# Patient Record
Sex: MALE | Race: WHITE | Employment: OTHER | URBAN - NONMETROPOLITAN AREA
[De-identification: names, ages, dates, MRNs, and addresses within clinical notes are randomized per-mention and may not be internally consistent; named-entity substitution may affect disease eponyms.]

---

## 2019-03-30 ENCOUNTER — NURSE TRIAGE (OUTPATIENT)
Dept: ADMINISTRATIVE | Age: 69
End: 2019-03-30

## 2020-02-04 ENCOUNTER — HOSPITAL ENCOUNTER (EMERGENCY)
Age: 70
Discharge: HOME | End: 2020-02-05
Attending: EMERGENCY MEDICINE
Payer: OTHER GOVERNMENT

## 2020-02-04 ENCOUNTER — APPOINTMENT (OUTPATIENT)
Dept: GENERAL RADIOLOGY | Age: 70
End: 2020-02-04
Payer: OTHER GOVERNMENT

## 2020-02-04 ENCOUNTER — APPOINTMENT (OUTPATIENT)
Dept: CT IMAGING | Age: 70
End: 2020-02-04
Payer: OTHER GOVERNMENT

## 2020-02-04 LAB
ANION GAP SERPL CALCULATED.3IONS-SCNC: 17 MEQ/L (ref 8–16)
BASOPHILS # BLD: 0.7 %
BASOPHILS ABSOLUTE: 0.1 THOU/MM3 (ref 0–0.1)
BUN BLDV-MCNC: 11 MG/DL (ref 7–22)
CALCIUM SERPL-MCNC: 8.8 MG/DL (ref 8.5–10.5)
CHLORIDE BLD-SCNC: 102 MEQ/L (ref 98–111)
CO2: 20 MEQ/L (ref 23–33)
CREAT SERPL-MCNC: 0.5 MG/DL (ref 0.4–1.2)
EKG ATRIAL RATE: 79 BPM
EKG P AXIS: 64 DEGREES
EKG P-R INTERVAL: 156 MS
EKG Q-T INTERVAL: 394 MS
EKG QRS DURATION: 98 MS
EKG QTC CALCULATION (BAZETT): 451 MS
EKG R AXIS: 60 DEGREES
EKG T AXIS: 56 DEGREES
EKG VENTRICULAR RATE: 79 BPM
EOSINOPHIL # BLD: 1.6 %
EOSINOPHILS ABSOLUTE: 0.1 THOU/MM3 (ref 0–0.4)
ERYTHROCYTE [DISTWIDTH] IN BLOOD BY AUTOMATED COUNT: 13.3 % (ref 11.5–14.5)
ERYTHROCYTE [DISTWIDTH] IN BLOOD BY AUTOMATED COUNT: 44.9 FL (ref 35–45)
ETHYL ALCOHOL, SERUM: 0.21 %
GFR SERPL CREATININE-BSD FRML MDRD: > 90 ML/MIN/1.73M2
GLUCOSE BLD-MCNC: 82 MG/DL (ref 70–108)
HCT VFR BLD CALC: 48.5 % (ref 42–52)
HEMOGLOBIN: 16.1 GM/DL (ref 14–18)
IMMATURE GRANS (ABS): 0.01 THOU/MM3 (ref 0–0.07)
IMMATURE GRANULOCYTES: 0.1 %
LYMPHOCYTES # BLD: 40.4 %
LYMPHOCYTES ABSOLUTE: 3.4 THOU/MM3 (ref 1–4.8)
MCH RBC QN AUTO: 30.2 PG (ref 26–33)
MCHC RBC AUTO-ENTMCNC: 33.2 GM/DL (ref 32.2–35.5)
MCV RBC AUTO: 91 FL (ref 80–94)
MONOCYTES # BLD: 8.7 %
MONOCYTES ABSOLUTE: 0.7 THOU/MM3 (ref 0.4–1.3)
NUCLEATED RED BLOOD CELLS: 0 /100 WBC
OSMOLALITY CALCULATION: 276 MOSMOL/KG (ref 275–300)
PLATELET # BLD: 142 THOU/MM3 (ref 130–400)
PMV BLD AUTO: 10.6 FL (ref 9.4–12.4)
POTASSIUM SERPL-SCNC: 4.4 MEQ/L (ref 3.5–5.2)
RBC # BLD: 5.33 MILL/MM3 (ref 4.7–6.1)
SEG NEUTROPHILS: 48.5 %
SEGMENTED NEUTROPHILS ABSOLUTE COUNT: 4.1 THOU/MM3 (ref 1.8–7.7)
SODIUM BLD-SCNC: 139 MEQ/L (ref 135–145)
WBC # BLD: 8.5 THOU/MM3 (ref 4.8–10.8)

## 2020-02-04 PROCEDURE — 12011 RPR F/E/E/N/L/M 2.5 CM/<: CPT

## 2020-02-04 PROCEDURE — 72170 X-RAY EXAM OF PELVIS: CPT

## 2020-02-04 PROCEDURE — 84443 ASSAY THYROID STIM HORMONE: CPT

## 2020-02-04 PROCEDURE — 36415 COLL VENOUS BLD VENIPUNCTURE: CPT

## 2020-02-04 PROCEDURE — 99285 EMERGENCY DEPT VISIT HI MDM: CPT

## 2020-02-04 PROCEDURE — 82248 BILIRUBIN DIRECT: CPT

## 2020-02-04 PROCEDURE — 70486 CT MAXILLOFACIAL W/O DYE: CPT

## 2020-02-04 PROCEDURE — 85025 COMPLETE CBC W/AUTO DIFF WBC: CPT

## 2020-02-04 PROCEDURE — 83735 ASSAY OF MAGNESIUM: CPT

## 2020-02-04 PROCEDURE — 80053 COMPREHEN METABOLIC PANEL: CPT

## 2020-02-04 PROCEDURE — G0480 DRUG TEST DEF 1-7 CLASSES: HCPCS

## 2020-02-04 PROCEDURE — 72125 CT NECK SPINE W/O DYE: CPT

## 2020-02-04 PROCEDURE — 70450 CT HEAD/BRAIN W/O DYE: CPT

## 2020-02-04 PROCEDURE — 71045 X-RAY EXAM CHEST 1 VIEW: CPT

## 2020-02-04 PROCEDURE — 93005 ELECTROCARDIOGRAM TRACING: CPT | Performed by: EMERGENCY MEDICINE

## 2020-02-04 PROCEDURE — 83690 ASSAY OF LIPASE: CPT

## 2020-02-04 PROCEDURE — 84484 ASSAY OF TROPONIN QUANT: CPT

## 2020-02-04 ASSESSMENT — PAIN DESCRIPTION - LOCATION: LOCATION: HEAD

## 2020-02-04 ASSESSMENT — PAIN SCALES - GENERAL: PAINLEVEL_OUTOF10: 5

## 2020-02-04 ASSESSMENT — PAIN DESCRIPTION - FREQUENCY: FREQUENCY: CONTINUOUS

## 2020-02-04 ASSESSMENT — PAIN DESCRIPTION - PAIN TYPE: TYPE: ACUTE PAIN

## 2020-02-05 VITALS
HEIGHT: 70 IN | RESPIRATION RATE: 18 BRPM | WEIGHT: 200 LBS | BODY MASS INDEX: 28.63 KG/M2 | TEMPERATURE: 98 F | HEART RATE: 82 BPM | SYSTOLIC BLOOD PRESSURE: 149 MMHG | OXYGEN SATURATION: 98 % | DIASTOLIC BLOOD PRESSURE: 96 MMHG

## 2020-02-05 LAB
ALBUMIN SERPL-MCNC: 3.9 G/DL (ref 3.5–5.1)
ALP BLD-CCNC: 51 U/L (ref 38–126)
ALT SERPL-CCNC: 26 U/L (ref 11–66)
AST SERPL-CCNC: 29 U/L (ref 5–40)
BILIRUB SERPL-MCNC: 0.4 MG/DL (ref 0.3–1.2)
BILIRUBIN DIRECT: < 0.2 MG/DL (ref 0–0.3)
LIPASE: 30.5 U/L (ref 5.6–51.3)
MAGNESIUM: 2.2 MG/DL (ref 1.6–2.4)
TOTAL PROTEIN: 7.1 G/DL (ref 6.1–8)
TROPONIN T: < 0.01 NG/ML
TSH SERPL DL<=0.05 MIU/L-ACNC: 3.22 UIU/ML (ref 0.4–4.2)

## 2020-02-05 PROCEDURE — 6370000000 HC RX 637 (ALT 250 FOR IP): Performed by: EMERGENCY MEDICINE

## 2020-02-05 RX ORDER — ONDANSETRON 4 MG/1
4 TABLET, ORALLY DISINTEGRATING ORAL 3 TIMES DAILY PRN
Qty: 21 TABLET | Refills: 0 | Status: SHIPPED | OUTPATIENT
Start: 2020-02-05 | End: 2020-09-17

## 2020-02-05 RX ORDER — ACETAMINOPHEN 325 MG/1
TABLET ORAL
Status: DISCONTINUED
Start: 2020-02-05 | End: 2020-02-05 | Stop reason: HOSPADM

## 2020-02-05 RX ORDER — ACETAMINOPHEN 325 MG/1
650 TABLET ORAL ONCE
Status: COMPLETED | OUTPATIENT
Start: 2020-02-05 | End: 2020-02-05

## 2020-02-05 RX ORDER — HYDROCODONE BITARTRATE AND ACETAMINOPHEN 5; 325 MG/1; MG/1
1 TABLET ORAL EVERY 6 HOURS PRN
Qty: 12 TABLET | Refills: 0 | Status: SHIPPED | OUTPATIENT
Start: 2020-02-05 | End: 2020-02-08

## 2020-02-05 RX ORDER — CEPHALEXIN 500 MG/1
500 CAPSULE ORAL 4 TIMES DAILY
Qty: 40 CAPSULE | Refills: 0 | Status: SHIPPED | OUTPATIENT
Start: 2020-02-05 | End: 2020-02-15

## 2020-02-05 RX ADMIN — ACETAMINOPHEN 650 MG: 325 TABLET ORAL at 00:14

## 2020-02-05 ASSESSMENT — ENCOUNTER SYMPTOMS
SORE THROAT: 0
ABDOMINAL PAIN: 0
BLOOD IN STOOL: 0
COUGH: 0
VOMITING: 0
EYE DISCHARGE: 0
RHINORRHEA: 0
EYE REDNESS: 0
EYE ITCHING: 0
SHORTNESS OF BREATH: 0
WHEEZING: 0
EYE PAIN: 0
BACK PAIN: 0
SINUS PRESSURE: 0
VOICE CHANGE: 0
PHOTOPHOBIA: 0
CHOKING: 0
ABDOMINAL DISTENTION: 0
CONSTIPATION: 0
NAUSEA: 0
CHEST TIGHTNESS: 0
DIARRHEA: 0
TROUBLE SWALLOWING: 0

## 2020-02-05 ASSESSMENT — PAIN DESCRIPTION - PAIN TYPE: TYPE: ACUTE PAIN

## 2020-02-05 ASSESSMENT — PAIN SCALES - GENERAL
PAINLEVEL_OUTOF10: 8
PAINLEVEL_OUTOF10: 5

## 2020-02-05 NOTE — ED PROVIDER NOTES
Head: Normocephalic and atraumatic. Right Ear: External ear normal.      Left Ear: External ear normal.      Nose: Nose normal.   Eyes:      General: Lids are normal. No scleral icterus. Right eye: No discharge. Left eye: No discharge. Conjunctiva/sclera: Conjunctivae normal.      Right eye: No exudate. Left eye: No exudate. Pupils: Pupils are equal, round, and reactive to light. Neck:      Musculoskeletal: Normal range of motion and neck supple. Normal range of motion. Thyroid: No thyromegaly. Vascular: No JVD. Trachea: No tracheal deviation. Cardiovascular:      Rate and Rhythm: Normal rate and regular rhythm. Pulses: Normal pulses. Heart sounds: Normal heart sounds, S1 normal and S2 normal. No murmur. No friction rub. No gallop. Pulmonary:      Effort: Pulmonary effort is normal. No respiratory distress. Breath sounds: Normal breath sounds. No stridor. No wheezing or rales. Chest:      Chest wall: No tenderness. Abdominal:      General: Bowel sounds are normal. There is no distension. Palpations: Abdomen is soft. There is no mass. Tenderness: There is no abdominal tenderness. There is no guarding or rebound. Musculoskeletal: Normal range of motion. General: No tenderness. Right shoulder: He exhibits no tenderness, no bony tenderness, no crepitus and normal strength. Lymphadenopathy:      Cervical: No cervical adenopathy. Skin:     General: Skin is warm and dry. Findings: No bruising, ecchymosis, lesion or rash. Neurological:      Mental Status: He is alert and oriented to person, place, and time. Cranial Nerves: No cranial nerve deficit. Sensory: No sensory deficit. Coordination: Coordination normal.      Deep Tendon Reflexes: Reflexes are normal and symmetric. Psychiatric:         Speech: Speech normal.         Behavior: Behavior normal.         Thought Content:  Thought content normal.         Judgment: Judgment normal.         DIFFERENTIAL DIAGNOSIS:   Frontal diagnosis discussed extensively bedside with the patient including but not limited to closed head injury, laceration abrasion. DIAGNOSTIC RESULTS     EKG: All EKG's are interpreted by the Emergency Department Physician who either signs or Co-signs this chart in the absence of a cardiologist.  None    RADIOLOGY: non-plain film images(s) such as CT, Ultrasound and MRI are read by the radiologist.  [address]   Final Result      No acute ischemic infarct, hemorrhage, or mass effect. Mild left frontal scalp soft tissue swelling. **This report has been created using voice recognition software. It may contain minor errors which are inherent in voice recognition technology. **      Final report electronically signed by Dr. Nikolai Coats on 2/5/2020 1:33 AM      CT CERVICAL SPINE WO CONTRAST   Final Result    No acute fracture or subluxation. Multilevel degenerative disc disease, DJD, and neural foraminal stenoses. Annular bulges and disc osteophyte complexes from C2-3 through C7-T1 with varying severity spinal stenosis and multilevel cord impingement/compression. **This report has been created using voice recognition software. It may contain minor errors which are inherent in voice recognition technology. **      Final report electronically signed by Dr. Nikolai Coats on 2/5/2020 1:41 AM      CT FACIAL BONES WO CONTRAST   Final Result   No acute fracture. Mild left frontal scalp soft tissue swelling. Dental disease. **This report has been created using voice recognition software. It may contain minor errors which are inherent in voice recognition technology. **      Final report electronically signed by Dr. Nikolai Coats on 2/5/2020 1:44 AM      XR PELVIS (1-2 VIEWS)   Final Result    No acute bone or joint abnormality. **This report has been created using voice recognition software.  It may contain minor errors which are inherent in voice recognition technology. **      Final report electronically signed by Dr. Yael Ricketts on 2/5/2020 12:13 AM      XR CHEST PORTABLE   Final Result      No acute cardiopulmonary disease. **This report has been created using voice recognition software. It may contain minor errors which are inherent in voice recognition technology. **      Final report electronically signed by Dr. Yael Ricketts on 2/5/2020 12:18 AM            LABS:   Labs Reviewed   BASIC METABOLIC PANEL - Abnormal; Notable for the following components:       Result Value    CO2 20 (*)     All other components within normal limits   ANION GAP - Abnormal; Notable for the following components:    Anion Gap 17.0 (*)     All other components within normal limits   CBC WITH AUTO DIFFERENTIAL   ETHANOL   HEPATIC FUNCTION PANEL   LIPASE   TROPONIN   MAGNESIUM   TSH WITHOUT REFLEX   GLOMERULAR FILTRATION RATE, ESTIMATED   OSMOLALITY       EMERGENCY DEPARTMENT COURSE:   Vitals:    Vitals:    02/04/20 2307 02/05/20 0007 02/05/20 0111   BP: (!) 158/109 131/86 (!) 149/96   Pulse: 72 64 82   Resp: 18 18 18   Temp: 98 °F (36.7 °C)     TempSrc: Oral     SpO2: 96% 98% 98%   Weight: 200 lb (90.7 kg)     Height: 5' 10\" (1.778 m)       Patient was assessed at bedside appropriate labs and imaging were ordered. He was neurologically intact. He was laughing at bedside. He had a small laceration above his left eyebrow. Multiple abrasions on his face. I reviewed all labs and imaging went back to reassess the patient he is doing quite well. No acute intracranial processes. Laceration was repaired by Padilla Gauthier please see note by her. Patient remained stable throughout course. Patient is subsequently discharged home in stable condition. He is instructed to follow-up with his primary care physician in 7 to 10 days to get the stitches removed.   Patient and son at bedside understood and agreed with this plan.  Patient is subsequently discharged home in good condition. Patient had a fall with some minor lacerations and contusions. Patient has been given pain medication and antibiotics and antinausea medication he is instructed to take it as prescribed. He is instructed to watch for any worsening of the symptoms such as increased nausea vomiting dizziness headaches fevers chills sweats discharge from the wound as this may indicate a worsening infection he should be coming back to the emergency room immediately. Patient is instructed to follow-up with his primary care office or with this department in 7 to 10 days for suture removal.  Patient is instructed to take all current medications as prescribed. Patient is instructed to return to the nearest emergency room immediately for any new or worsening complaints. CRITICAL CARE:   None    CONSULTS:  None    PROCEDURES:  None    FINAL IMPRESSION      1. Fall, initial encounter    2. Contusion of face, initial encounter    3. Facial laceration, initial encounter    4. Closed head injury, initial encounter          DISPOSITION/PLAN   Discharge    PATIENT REFERRED TO:  Sunitha Stanton, [address] [place] [id number]  [phone]    In 1 week  For suture removal    [facility] EMERGENCY DEPT  [place] [id number]  [phone]  Go in 1 week  For suture removal      DISCHARGE MEDICATIONS:  Discharge Medication List as of 2/5/2020  1:50 AM      START taking these medications    Details   cephALEXin (KEFLEX) 500 MG capsule Take 1 capsule by mouth 4 times daily for 10 days, Disp-40 capsule, R-0Print      HYDROcodone-acetaminophen (NORCO) 5-325 MG per tablet Take 1 tablet by mouth every 6 hours as needed for Pain for up to 3 days. , Disp-12 tablet, R-0Print      ondansetron (ZOFRAN-ODT) 4 MG disintegrating tablet Take 1 tablet by mouth 3 times daily as needed for Nausea or Vomiting, Disp-21 tablet, R-0Print             (Please note that portions of this note were completed with a voice recognition program.  Efforts were made to edit the dictations but occasionally words are mis-transcribed.)    DO Ramu Tellez DO  02/05/20 1359

## 2020-08-25 ENCOUNTER — HOSPITAL ENCOUNTER (OUTPATIENT)
Age: 70
Discharge: HOME | End: 2020-08-25
Payer: OTHER GOVERNMENT

## 2020-08-25 ENCOUNTER — HOSPITAL ENCOUNTER (OUTPATIENT)
Dept: MRI IMAGING | Age: 70
Discharge: HOME | End: 2020-08-25
Payer: OTHER GOVERNMENT

## 2020-08-25 ENCOUNTER — HOSPITAL ENCOUNTER (OUTPATIENT)
Dept: GENERAL RADIOLOGY | Age: 70
Discharge: HOME | End: 2020-08-25
Payer: OTHER GOVERNMENT

## 2020-08-25 PROCEDURE — 72100 X-RAY EXAM L-S SPINE 2/3 VWS: CPT

## 2020-08-25 PROCEDURE — 73521 X-RAY EXAM HIPS BI 2 VIEWS: CPT

## 2020-08-25 PROCEDURE — 72148 MRI LUMBAR SPINE W/O DYE: CPT

## 2020-09-17 ENCOUNTER — HOSPITAL ENCOUNTER (EMERGENCY)
Age: 70
Discharge: HOME | End: 2020-09-17
Attending: EMERGENCY MEDICINE
Payer: OTHER GOVERNMENT

## 2020-09-17 ENCOUNTER — APPOINTMENT (OUTPATIENT)
Dept: CT IMAGING | Age: 70
End: 2020-09-17
Payer: OTHER GOVERNMENT

## 2020-09-17 ENCOUNTER — APPOINTMENT (OUTPATIENT)
Dept: GENERAL RADIOLOGY | Age: 70
End: 2020-09-17
Payer: OTHER GOVERNMENT

## 2020-09-17 VITALS
WEIGHT: 180 LBS | HEART RATE: 74 BPM | DIASTOLIC BLOOD PRESSURE: 68 MMHG | RESPIRATION RATE: 15 BRPM | SYSTOLIC BLOOD PRESSURE: 112 MMHG | TEMPERATURE: 98.1 F | OXYGEN SATURATION: 98 % | BODY MASS INDEX: 25.83 KG/M2

## 2020-09-17 LAB
ANION GAP SERPL CALCULATED.3IONS-SCNC: 10 MEQ/L (ref 8–16)
BASOPHILS # BLD: 0.3 %
BASOPHILS ABSOLUTE: 0 THOU/MM3 (ref 0–0.1)
BUN BLDV-MCNC: 14 MG/DL (ref 7–22)
CALCIUM SERPL-MCNC: 8.7 MG/DL (ref 8.5–10.5)
CHLORIDE BLD-SCNC: 100 MEQ/L (ref 98–111)
CO2: 24 MEQ/L (ref 23–33)
CREAT SERPL-MCNC: 0.5 MG/DL (ref 0.4–1.2)
EKG ATRIAL RATE: 75 BPM
EKG P AXIS: 57 DEGREES
EKG P-R INTERVAL: 146 MS
EKG Q-T INTERVAL: 372 MS
EKG QRS DURATION: 92 MS
EKG QTC CALCULATION (BAZETT): 415 MS
EKG R AXIS: 61 DEGREES
EKG T AXIS: 58 DEGREES
EKG VENTRICULAR RATE: 75 BPM
EOSINOPHIL # BLD: 0.3 %
EOSINOPHILS ABSOLUTE: 0 THOU/MM3 (ref 0–0.4)
ERYTHROCYTE [DISTWIDTH] IN BLOOD BY AUTOMATED COUNT: 12.7 % (ref 11.5–14.5)
ERYTHROCYTE [DISTWIDTH] IN BLOOD BY AUTOMATED COUNT: 41.9 FL (ref 35–45)
GFR SERPL CREATININE-BSD FRML MDRD: > 90 ML/MIN/1.73M2
GLUCOSE BLD-MCNC: 94 MG/DL (ref 70–108)
HCT VFR BLD CALC: 47.7 % (ref 42–52)
HEMOGLOBIN: 15.7 GM/DL (ref 14–18)
IMMATURE GRANS (ABS): 0.02 THOU/MM3 (ref 0–0.07)
IMMATURE GRANULOCYTES: 0.6 %
LYMPHOCYTES # BLD: 24.9 %
LYMPHOCYTES ABSOLUTE: 0.9 THOU/MM3 (ref 1–4.8)
MCH RBC QN AUTO: 29.4 PG (ref 26–33)
MCHC RBC AUTO-ENTMCNC: 32.9 GM/DL (ref 32.2–35.5)
MCV RBC AUTO: 89.3 FL (ref 80–94)
MONOCYTES # BLD: 13.8 %
MONOCYTES ABSOLUTE: 0.5 THOU/MM3 (ref 0.4–1.3)
NUCLEATED RED BLOOD CELLS: 0 /100 WBC
OSMOLALITY CALCULATION: 268.5 MOSMOL/KG (ref 275–300)
PLATELET # BLD: 120 THOU/MM3 (ref 130–400)
PMV BLD AUTO: 10.4 FL (ref 9.4–12.4)
POTASSIUM REFLEX MAGNESIUM: 4.2 MEQ/L (ref 3.5–5.2)
PRO-BNP: 13 PG/ML (ref 0–900)
RBC # BLD: 5.34 MILL/MM3 (ref 4.7–6.1)
SEG NEUTROPHILS: 60.1 %
SEGMENTED NEUTROPHILS ABSOLUTE COUNT: 2.2 THOU/MM3 (ref 1.8–7.7)
SODIUM BLD-SCNC: 134 MEQ/L (ref 135–145)
TROPONIN T: < 0.01 NG/ML
WBC # BLD: 3.6 THOU/MM3 (ref 4.8–10.8)

## 2020-09-17 PROCEDURE — 96374 THER/PROPH/DIAG INJ IV PUSH: CPT

## 2020-09-17 PROCEDURE — 83880 ASSAY OF NATRIURETIC PEPTIDE: CPT

## 2020-09-17 PROCEDURE — U0003 INFECTIOUS AGENT DETECTION BY NUCLEIC ACID (DNA OR RNA); SEVERE ACUTE RESPIRATORY SYNDROME CORONAVIRUS 2 (SARS-COV-2) (CORONAVIRUS DISEASE [COVID-19]), AMPLIFIED PROBE TECHNIQUE, MAKING USE OF HIGH THROUGHPUT TECHNOLOGIES AS DESCRIBED BY CMS-2020-01-R: HCPCS

## 2020-09-17 PROCEDURE — 85025 COMPLETE CBC W/AUTO DIFF WBC: CPT

## 2020-09-17 PROCEDURE — 2580000003 HC RX 258: Performed by: EMERGENCY MEDICINE

## 2020-09-17 PROCEDURE — U0002 COVID-19 LAB TEST NON-CDC: HCPCS

## 2020-09-17 PROCEDURE — 93005 ELECTROCARDIOGRAM TRACING: CPT | Performed by: EMERGENCY MEDICINE

## 2020-09-17 PROCEDURE — 71045 X-RAY EXAM CHEST 1 VIEW: CPT

## 2020-09-17 PROCEDURE — 36415 COLL VENOUS BLD VENIPUNCTURE: CPT

## 2020-09-17 PROCEDURE — 74176 CT ABD & PELVIS W/O CONTRAST: CPT

## 2020-09-17 PROCEDURE — 93010 ELECTROCARDIOGRAM REPORT: CPT | Performed by: NUCLEAR MEDICINE

## 2020-09-17 PROCEDURE — 99284 EMERGENCY DEPT VISIT MOD MDM: CPT

## 2020-09-17 PROCEDURE — 6360000002 HC RX W HCPCS: Performed by: EMERGENCY MEDICINE

## 2020-09-17 PROCEDURE — 84484 ASSAY OF TROPONIN QUANT: CPT

## 2020-09-17 PROCEDURE — 80048 BASIC METABOLIC PNL TOTAL CA: CPT

## 2020-09-17 RX ORDER — ONDANSETRON 4 MG/1
4 TABLET, ORALLY DISINTEGRATING ORAL EVERY 8 HOURS PRN
Qty: 20 TABLET | Refills: 0 | Status: SHIPPED | OUTPATIENT
Start: 2020-09-17 | End: 2022-05-19

## 2020-09-17 RX ORDER — 0.9 % SODIUM CHLORIDE 0.9 %
1000 INTRAVENOUS SOLUTION INTRAVENOUS ONCE
Status: COMPLETED | OUTPATIENT
Start: 2020-09-17 | End: 2020-09-17

## 2020-09-17 RX ORDER — ONDANSETRON 2 MG/ML
4 INJECTION INTRAMUSCULAR; INTRAVENOUS ONCE
Status: COMPLETED | OUTPATIENT
Start: 2020-09-17 | End: 2020-09-17

## 2020-09-17 RX ORDER — LISINOPRIL 10 MG/1
10 TABLET ORAL DAILY
COMMUNITY

## 2020-09-17 RX ADMIN — SODIUM CHLORIDE 1000 ML: 9 INJECTION, SOLUTION INTRAVENOUS at 18:49

## 2020-09-17 RX ADMIN — ONDANSETRON 4 MG: 2 INJECTION INTRAMUSCULAR; INTRAVENOUS at 18:49

## 2020-09-17 ASSESSMENT — PAIN DESCRIPTION - ORIENTATION: ORIENTATION: MID

## 2020-09-17 ASSESSMENT — PAIN DESCRIPTION - ONSET: ONSET: ON-GOING

## 2020-09-17 ASSESSMENT — PAIN DESCRIPTION - PROGRESSION: CLINICAL_PROGRESSION: GRADUALLY IMPROVING

## 2020-09-17 ASSESSMENT — PAIN DESCRIPTION - LOCATION: LOCATION: ABDOMEN

## 2020-09-17 ASSESSMENT — PAIN DESCRIPTION - PAIN TYPE: TYPE: ACUTE PAIN

## 2020-09-17 ASSESSMENT — PAIN DESCRIPTION - FREQUENCY: FREQUENCY: CONTINUOUS

## 2020-09-17 ASSESSMENT — PAIN DESCRIPTION - DESCRIPTORS: DESCRIPTORS: ACHING

## 2020-09-17 ASSESSMENT — PAIN SCALES - GENERAL: PAINLEVEL_OUTOF10: 2

## 2020-09-17 NOTE — ED NOTES
Patient is resting in bed with easy and unlabored respirations. Call light in reach. Side rails up x2. Patient denies further complaints or concerns. Will monitor.         Kat Harris RN  09/17/20 4132

## 2020-09-17 NOTE — ED PROVIDER NOTES
[facility] EMERGENCY DEPT      CHIEF COMPLAINT       Chief Complaint   Patient presents with    Fatigue    Nausea       Nurses Notes reviewed and I agree except as noted in the HPI. HISTORY OF PRESENT ILLNESS    Jenny Martinez is a 72 y.o. male who presents with complaint of fatigue, nausea, poor appetite, fever that has since resolved. No chest pain. He has mild abdominal pain, mostly left abdomen, denies diarrhea. Onset: Acute  Duration: About 5 days  Timing: Constant  Location of Pain: Left abdomen  Intesity/severity: Mild  Modifying Factors: Not apparent  Relieved by;  Previous Episodes; Tx Before arrival: None  REVIEW OF SYSTEMS      Review of Systems   Constitutional: Negative for fever, chills, diaphoresis; positive for fatigue, poor appetite. HENT: Negative for congestion, drooling, facial swelling and sore throat. Eyes: Negative for photophobia, pain and discharge. Respiratory: Negative for cough, shortness of breath, wheezing and stridor. Cardiovascular: Negative for chest pain, palpitations and leg swelling. Gastrointestinal: Positive for abdominal pain; negative for blood in stool and abdominal distention. Genitourinary: Negative for dysuria, urgency, hematuria and difficulty urinating. Musculoskeletal: Negative for gait problem, neck pain and neck stiffness. Skin; No rash, No itching  Neurological: Negative for seizures, weakness and numbness. Psychiatric/Behavioral: Negative for hallucinations, confusion and agitation. PAST MEDICAL HISTORY    has a past medical history of Gresham's esophagus, Hyperlipidemia, and Hypertension. SURGICAL HISTORY      has a past surgical history that includes Knee Arthroplasty (Bilateral); Neck surgery; Cholecystectomy (2005); hernia repair (2005); Colonoscopy (2015); and EGD (2017).     CURRENT MEDICATIONS       Discharge Medication List as of 9/17/2020  7:58 PM      CONTINUE these medications which have NOT CHANGED    Details lisinopril (PRINIVIL;ZESTRIL) 10 MG tablet Take 10 mg by mouth dailyHistorical Med      polyethylene glycol (GLYCOLAX) powder Colonoscopy Prep Dispense 238 Gram Bottle. Use as Directed, Disp-238 g, R-0Print      oxyCODONE-acetaminophen (PERCOCET)  MG per tablet Take 1 tablet by mouth every 8 hours as needed for Pain. Historical Med             ALLERGIES     is allergic to dilaudid [hydromorphone] and tramadol. FAMILY HISTORY     He indicated that the status of his neg hx is unknown.   family history is not on file. SOCIAL HISTORY      reports that he quit smoking about 10 years ago. His smokeless tobacco use includes chew. He reports current alcohol use. He reports that he does not use drugs. PHYSICAL EXAM     INITIAL VITALS:  weight is 180 lb (81.6 kg). His oral temperature is 98.1 °F (36.7 °C). His blood pressure is 112/68 and his pulse is 74. His respiration is 15 and oxygen saturation is 98%. Physical Exam   Constitutional:  well-developed and well-nourished. HENT: Head: Normocephalic, atraumatic, Bilateral external ears normal, Oropharynx mosit, No oral exudates, Nose normal.   Eyes: PERRL, EOMI, Conjunctiva normal, No discharge. No scleral icterus  Neck: Normal range of motion, No tenderness, Supple  Cardiovascular: Normal rate, regular rhythm, S1 normal and S2 normal.  Exam reveals no gallop. Pulmonary/Chest: Effort normal and breath sounds normal. No accessory muscle usage or stridor. No respiratory distress. no wheezes. has no rales. exhibits no tenderness. Abdominal: Soft. Bowel sounds are normal.  exhibits no distension. There is no tenderness. There is no rebound and no guarding. Extremities: No edema, no tenderness, no cyanosis, no clubbing. Musculoskeletal: Good range of motion in major joints is observed. No major deformities noted. Neurological: Alert and oriented ×3, normal motor function, normal sensory function, no focal deficits.   GCS 15  Skin: Skin is warm, dry and intact. No rash noted. No erythema. Psychiatric: Affect normal, judgment normal, mood normal.  DIFFERENTIAL DIAGNOSIS:   COVID, pneumonia, diverticulitis    DIAGNOSTIC RESULTS     EKG: All EKG's are interpreted by the Emergency Department Physician who either signs or Co-signs this chart in the absence of a cardiologist.      RADIOLOGY: non-plain film images(s) such as CT, Ultrasound and MRI are read by the radiologist.  Plain radiographic images are visualized and preliminarily interpreted by the emergency physician unless otherwise stated below. LABS:   Labs Reviewed   CBC WITH AUTO DIFFERENTIAL - Abnormal; Notable for the following components:       Result Value    WBC 3.6 (*)     Platelets 172 (*)     Lymphocytes Absolute 0.9 (*)     All other components within normal limits   BASIC METABOLIC PANEL W/ REFLEX TO MG FOR LOW K - Abnormal; Notable for the following components:    Sodium 134 (*)     All other components within normal limits   OSMOLALITY - Abnormal; Notable for the following components:    Osmolality Calc 268.5 (*)     All other components within normal limits   TROPONIN   BRAIN NATRIURETIC PEPTIDE   ANION GAP   GLOMERULAR FILTRATION RATE, ESTIMATED   URINE RT REFLEX TO CULTURE   COVID-19   COVID-19       EMERGENCY DEPARTMENT COURSE:   Vitals:    Vitals:    09/17/20 1740 09/17/20 1850 09/17/20 2003   BP: 112/83 114/76 112/68   Pulse: 80 85 74   Resp: 13 16 15   Temp: 98.1 °F (36.7 °C)     TempSrc: Oral     SpO2: 97% 97% 98%   Weight: 180 lb (81.6 kg)       Patient presenting with complaint of poor appetite, abdominal pain, nausea. Abdomen is nonacute, mild tenderness left abdomen. CRITICAL CARE:       CONSULTS:  None    PROCEDURES:  None    FINAL IMPRESSION      1. Fatigue, unspecified type    2. Poor appetite          DISPOSITION/PLAN   Decision To Discharge    PATIENT REFERRED TO:  No follow-up provider specified.     DISCHARGE MEDICATIONS:  Discharge Medication List as of 9/17/2020

## 2020-09-17 NOTE — ED NOTES
Patient to ED with complaints of fatigue x4 days. Patient states that he had a fever 3 days ago and states that this has resolved itself. He states that his appetite is still decreased however and has intermittent nausea. Patient is resting in bed with easy and unlabored respirations. Call light in reach. Side rails up x2. Patient denies further complaints or concerns. Will monitor.         Endy Rachel RN  09/17/20 2954

## 2020-09-19 LAB — SARS-COV-2: NOT DETECTED

## 2021-04-16 ENCOUNTER — HOSPITAL ENCOUNTER (OUTPATIENT)
Age: 71
Discharge: HOME | End: 2021-04-16
Payer: OTHER GOVERNMENT

## 2021-04-16 ENCOUNTER — HOSPITAL ENCOUNTER (OUTPATIENT)
Dept: GENERAL RADIOLOGY | Age: 71
Discharge: HOME | End: 2021-04-16
Payer: OTHER GOVERNMENT

## 2021-04-16 DIAGNOSIS — M25.512 LEFT SHOULDER PAIN, UNSPECIFIED CHRONICITY: ICD-10-CM

## 2021-04-16 DIAGNOSIS — M54.2 NECK PAIN: ICD-10-CM

## 2021-04-16 PROCEDURE — 73030 X-RAY EXAM OF SHOULDER: CPT

## 2021-04-16 PROCEDURE — 72040 X-RAY EXAM NECK SPINE 2-3 VW: CPT

## 2022-05-19 ENCOUNTER — OFFICE VISIT (OUTPATIENT)
Dept: PHYSICAL MEDICINE AND REHAB | Age: 72
End: 2022-05-19
Payer: OTHER GOVERNMENT

## 2022-05-19 VITALS
BODY MASS INDEX: 27.11 KG/M2 | HEIGHT: 70 IN | DIASTOLIC BLOOD PRESSURE: 86 MMHG | SYSTOLIC BLOOD PRESSURE: 120 MMHG | WEIGHT: 189.4 LBS

## 2022-05-19 DIAGNOSIS — G89.29 OTHER CHRONIC PAIN: ICD-10-CM

## 2022-05-19 DIAGNOSIS — M79.18 MYOFASCIAL PAIN: ICD-10-CM

## 2022-05-19 DIAGNOSIS — M47.812 CERVICAL SPONDYLOSIS: Primary | ICD-10-CM

## 2022-05-19 DIAGNOSIS — M50.90 CERVICAL DISC DISEASE: ICD-10-CM

## 2022-05-19 PROCEDURE — 99204 OFFICE O/P NEW MOD 45 MIN: CPT | Performed by: ANESTHESIOLOGY

## 2022-05-19 NOTE — PROGRESS NOTES
Chronic Pain/PM&R Clinic Note     Encounter Date: 5/19/22    Subjective:   Chief Complaint:   Chief Complaint   Patient presents with    New Patient     Lumbar, neck and bilateral shoulder pain       History of Present Illness:   Jaleesa Rosas is a 80 y.o. male seen in the clinic initially on 05/19/22 upon request from Peter Lyn DO (PCP) for his history of chronic neck pain. He has a medical history of previous cervical discectomy in 1990. He states he has longstanding history of neck pain since his neck surgery 1990. He describes the majority of his pain to be on the left side of his neck but also has some right-sided neck pain. He states that this pain starts at the nape of his neck and can radiate down into his shoulder blade region and trapezius. He states that the pain is worse when he is extending his neck. His pain is improved with rest and medications. He is being medically managed through the [place] [facility] and is currently taking Percocet 3 times daily. He denies any pain rating further down his arm, he denies any associated numbness/tingling, weakness, worsening balance/gait disturbances. He also denies any associated headaches with this. Of note, he is a  of the [employer] He is an avid golfer as well and would like to try to improve his cough again if he gets improvement in his neck range of motion. He is also seen pain management in the past with Dr. La Magallanes. He states he has had multiple different neck injections which were effective for him but he never pursued the cervical radiofrequency ablation.       History of Interventions:   Surgery: No previous lumbar/cervical surgeries  Injections: None    Imaging/Studies:  XR C-spine (4/16/21)     PROCEDURE: XR CERVICAL SPINE (2-3 VIEWS)       CLINICAL INFORMATION: Neck pain, Left shoulder pain, unspecified chronicity       COMPARISON: No prior study.       TECHNIQUE: Standard AP, lateral, and odontoid views of the cervical spine were obtained.               Impression   1. Moderate multilevel degenerative facet arthropathy bilaterally, slightly worse on the left. Moderate uncinate spurring C4-5 and C5-6 bilaterally and questionably at C6-7 right side. 2. Mild reversal normal cervical lordosis. Cannot exclude muscle spasm. 3. Moderate disc space narrowing C4-5, C5-6, C6-7. Moderate vertebral body spondylosis lower half of cervical spine. Slight retrolisthesis C4 upon C5, 2 mm. Prevertebral soft tissues unremarkable. Note that C7 is partially obscured by the shoulders on    the lateral view         Past Medical History:   Diagnosis Date    Gresham's esophagus     Hyperlipidemia     Hypertension        Past Surgical History:   Procedure Laterality Date    CHOLECYSTECTOMY  2005    COLONOSCOPY  2015    EGD  2017    HERNIA REPAIR  2005    KNEE ARTHROPLASTY Bilateral     NECK SURGERY         Family History   Problem Relation Age of Onset    Colon Cancer Neg Hx     Esophageal Cancer Neg Hx     Rectal Cancer Neg Hx     Stomach Cancer Neg Hx          Medications & Allergies:   Current Outpatient Medications   Medication Instructions    lisinopril (PRINIVIL;ZESTRIL) 10 mg, Oral, Patient states he only takes it a couple times a week.     oxyCODONE-acetaminophen (PERCOCET)  MG per tablet 1 tablet, Oral, EVERY 8 HOURS PRN       Allergies   Allergen Reactions    Dilaudid [Hydromorphone] Shortness Of Breath    Tramadol Shortness Of Breath       Review of Systems:   Constitutional: negative for weight changes or fevers  Genitourinary: negative for bowel/bladder incontinence   Musculoskeletal: positive for neck pain  Neurological: negative for any arm weakness or numbness/tingling  Behavioral/Psych: negative for anxiety/depression   All other systems reviewed and are negative    Objective:     Vitals:    05/19/22 0854   BP: 120/86     Constitutional: Pleasant, no acute distress   Head: Normocephalic, atraumatic Eyes: Conjunctivae normal   Neck: Supple, symmetrical   Respiration: Non-labored breathing   Cardiovascular: Limbs warm and well perfused   Musculoskeletal: Reduced cervical ROM in extension and left lateral rotation. Negative Spurling maneuver bilaterally. Increased pain with facet loading. Tenderness to palpation in cervical paraspinals and left trapezius. Neuro: Alert, oriented. CN II-XII appear grossly intact. Motor strength 5/5 SAb, EF, EE, WE, Beatrice. LT sensation intact in upper limbs. Biceps/triceps reflexes 2+ and symmetrical. Negative Guillory bilaterally. Skin: no skin rashes or lesions noted   Psychological: Cooperative, no exaggerated pain behaviors       Assessment:    Diagnosis Orders   1. Cervical spondylosis  CHG FLUOR NEEDLE/CATH SPINE/PARASPINAL DX/THER ADDON    CO INJ DX/THER AGNT PARAVERT FACET JOINT, CERV/THORAC, 1ST LEVEL    CO INJ DX/THER AGNT PARAVERT FACET JOINT, CERV/THORAC, 2ND LEVEL    CO INJ DX/THER AGNT PARAVERT FACET JOINT, CERV/THORAC, ADD LEVEL   2. Myofascial pain     3. Cervical disc disease     4. Other chronic pain           Dax Mendoza is a 80 y.o.male presenting to the pain clinic for evaluation of chronic neck pain. His clinical history and physical examination are consistent with cervical facet mediated pain secondary to cervical degenerative disc disease and cervical spondylosis. I set the patient up for diagnostic cervical medial branch blocks targeting the bilateral facet joints at C3/C4, C4/C5, C5/C6. Plan: The following treatment recommendations and plan were discussed in detail with Dax Mendoza. Imaging/Studies/Labs:   I have reviewed patients imaging of cervical neck XR and results were discussed with patient today. Interventions: In presence of cervical neck and with physical exam consistent for facetal pain, the option of medial branch blocks at bilateral C3/C4, C4/C5, and C5/C6 was chosen.  The risks and benefits were discussed in detail with the patient. Patient wants to proceed with the injection. Anticoagulation/NPO Recommendations:   Patient will need to hold NSAIDS x 2 days prior to the procedure. Patient will need to be NPO x 8 hours prior to the procedure. Plan to start an IV prior to the procedure. Multidisciplinary Pain Management:   In the presence of complex, chronic, and multi-factorial pain, the importance of a multidisciplinary approach to pain management in the patients management regimen was emphasized and discussed in great detail.    PHYSICAL THERAPY: Patient given instructions on neck range of motion exercises he can perform at home    Referrals:  None    Prescriptions Written This Visit:   None    Follow-up: For cervical MBBs      Salazar Lopez, DO  Interventional Pain Management/PM&R   [place]

## 2022-06-15 ENCOUNTER — PREP FOR PROCEDURE (OUTPATIENT)
Dept: PHYSICAL MEDICINE AND REHAB | Age: 72
End: 2022-06-15

## 2022-06-21 ENCOUNTER — TELEPHONE (OUTPATIENT)
Dept: PHYSICAL MEDICINE AND REHAB | Age: 72
End: 2022-06-21

## 2022-06-21 NOTE — TELEPHONE ENCOUNTER
Notified by the Frank Menezes that pt. Was ill and did not come to procedure. Pt. Contacted. Procedure and follow up rescheduled. Another copy of his pre-procedure instructions mailed. Verbalized understanding.

## 2022-06-29 ENCOUNTER — PREP FOR PROCEDURE (OUTPATIENT)
Dept: PHYSICAL MEDICINE AND REHAB | Age: 72
End: 2022-06-29

## 2022-07-06 ENCOUNTER — TELEPHONE (OUTPATIENT)
Dept: PHYSICAL MEDICINE AND REHAB | Age: 72
End: 2022-07-06

## 2022-07-06 NOTE — TELEPHONE ENCOUNTER
Notified by the Frank Menezes that pt. Did not come to procedure today. They did contact him and he was still in bed. Pt. Called. LVM to call the office. Procedure and follow up cancelled.

## 2023-11-15 ENCOUNTER — HOSPITAL ENCOUNTER (EMERGENCY)
Age: 73
Discharge: HOME | End: 2023-11-15
Attending: EMERGENCY MEDICINE
Payer: OTHER GOVERNMENT

## 2023-11-15 ENCOUNTER — APPOINTMENT (OUTPATIENT)
Dept: CT IMAGING | Age: 73
End: 2023-11-15
Payer: OTHER GOVERNMENT

## 2023-11-15 ENCOUNTER — APPOINTMENT (OUTPATIENT)
Dept: GENERAL RADIOLOGY | Age: 73
End: 2023-11-15
Payer: OTHER GOVERNMENT

## 2023-11-15 VITALS
HEART RATE: 82 BPM | TEMPERATURE: 98.2 F | BODY MASS INDEX: 25.77 KG/M2 | DIASTOLIC BLOOD PRESSURE: 94 MMHG | RESPIRATION RATE: 16 BRPM | SYSTOLIC BLOOD PRESSURE: 140 MMHG | OXYGEN SATURATION: 99 % | WEIGHT: 180 LBS | HEIGHT: 70 IN

## 2023-11-15 DIAGNOSIS — I10 ESSENTIAL HYPERTENSION: Primary | ICD-10-CM

## 2023-11-15 LAB
ANION GAP SERPL CALC-SCNC: 12 MEQ/L (ref 8–16)
BASOPHILS ABSOLUTE: 0 THOU/MM3 (ref 0–0.1)
BASOPHILS NFR BLD AUTO: 0.4 %
BUN SERPL-MCNC: 13 MG/DL (ref 7–22)
CALCIUM SERPL-MCNC: 9 MG/DL (ref 8.5–10.5)
CHLORIDE SERPL-SCNC: 105 MEQ/L (ref 98–111)
CO2 SERPL-SCNC: 22 MEQ/L (ref 23–33)
CREAT SERPL-MCNC: 0.6 MG/DL (ref 0.4–1.2)
DEPRECATED RDW RBC AUTO: 46.6 FL (ref 35–45)
EOSINOPHIL NFR BLD AUTO: 1.3 %
EOSINOPHILS ABSOLUTE: 0.1 THOU/MM3 (ref 0–0.4)
ERYTHROCYTE [DISTWIDTH] IN BLOOD BY AUTOMATED COUNT: 13.7 % (ref 11.5–14.5)
GFR SERPL CREATININE-BSD FRML MDRD: > 60 ML/MIN/1.73M2
GLUCOSE SERPL-MCNC: 84 MG/DL (ref 70–108)
HCT VFR BLD AUTO: 51 % (ref 42–52)
HGB BLD-MCNC: 16.9 GM/DL (ref 14–18)
IMM GRANULOCYTES # BLD AUTO: 0.01 THOU/MM3 (ref 0–0.07)
IMM GRANULOCYTES NFR BLD AUTO: 0.1 %
LYMPHOCYTES ABSOLUTE: 2 THOU/MM3 (ref 1–4.8)
LYMPHOCYTES NFR BLD AUTO: 28.4 %
MAGNESIUM SERPL-MCNC: 2.1 MG/DL (ref 1.6–2.4)
MCH RBC QN AUTO: 30.6 PG (ref 26–33)
MCHC RBC AUTO-ENTMCNC: 33.1 GM/DL (ref 32.2–35.5)
MCV RBC AUTO: 92.2 FL (ref 80–94)
MONOCYTES ABSOLUTE: 0.5 THOU/MM3 (ref 0.4–1.3)
MONOCYTES NFR BLD AUTO: 6.6 %
NEUTROPHILS NFR BLD AUTO: 63.2 %
NRBC BLD AUTO-RTO: 0 /100 WBC
NT-PROBNP SERPL IA-MCNC: < 36 PG/ML (ref 0–124)
OSMOLALITY SERPL CALC.SUM OF ELEC: 276.8 MOSMOL/KG (ref 275–300)
PLATELET # BLD AUTO: 187 THOU/MM3 (ref 130–400)
PMV BLD AUTO: 11.2 FL (ref 9.4–12.4)
POTASSIUM SERPL-SCNC: 4.3 MEQ/L (ref 3.5–5.2)
RBC # BLD AUTO: 5.53 MILL/MM3 (ref 4.7–6.1)
SEGMENTED NEUTROPHILS ABSOLUTE COUNT: 4.4 THOU/MM3 (ref 1.8–7.7)
SODIUM SERPL-SCNC: 139 MEQ/L (ref 135–145)
TROPONIN, HIGH SENSITIVITY: 13 NG/L (ref 0–12)
TROPONIN, HIGH SENSITIVITY: 14 NG/L (ref 0–12)
WBC # BLD AUTO: 6.9 THOU/MM3 (ref 4.8–10.8)

## 2023-11-15 PROCEDURE — 80048 BASIC METABOLIC PNL TOTAL CA: CPT

## 2023-11-15 PROCEDURE — 85025 COMPLETE CBC W/AUTO DIFF WBC: CPT

## 2023-11-15 PROCEDURE — 71045 X-RAY EXAM CHEST 1 VIEW: CPT

## 2023-11-15 PROCEDURE — 36415 COLL VENOUS BLD VENIPUNCTURE: CPT

## 2023-11-15 PROCEDURE — 84484 ASSAY OF TROPONIN QUANT: CPT

## 2023-11-15 PROCEDURE — 99285 EMERGENCY DEPT VISIT HI MDM: CPT

## 2023-11-15 PROCEDURE — 83735 ASSAY OF MAGNESIUM: CPT

## 2023-11-15 PROCEDURE — 83880 ASSAY OF NATRIURETIC PEPTIDE: CPT

## 2023-11-15 PROCEDURE — 96374 THER/PROPH/DIAG INJ IV PUSH: CPT

## 2023-11-15 PROCEDURE — 6360000002 HC RX W HCPCS

## 2023-11-15 PROCEDURE — 96372 THER/PROPH/DIAG INJ SC/IM: CPT

## 2023-11-15 PROCEDURE — 70450 CT HEAD/BRAIN W/O DYE: CPT

## 2023-11-15 PROCEDURE — 93005 ELECTROCARDIOGRAM TRACING: CPT | Performed by: EMERGENCY MEDICINE

## 2023-11-15 RX ORDER — DIPHENHYDRAMINE HYDROCHLORIDE 50 MG/ML
25 INJECTION INTRAMUSCULAR; INTRAVENOUS EVERY 6 HOURS PRN
Status: DISCONTINUED | OUTPATIENT
Start: 2023-11-15 | End: 2023-11-15 | Stop reason: HOSPADM

## 2023-11-15 RX ORDER — LOSARTAN POTASSIUM 25 MG/1
25 TABLET ORAL DAILY
COMMUNITY

## 2023-11-15 RX ORDER — DROPERIDOL 2.5 MG/ML
1.25 INJECTION, SOLUTION INTRAMUSCULAR; INTRAVENOUS EVERY 6 HOURS PRN
Status: DISCONTINUED | OUTPATIENT
Start: 2023-11-15 | End: 2023-11-15 | Stop reason: HOSPADM

## 2023-11-15 RX ADMIN — DROPERIDOL 1.25 MG: 2.5 INJECTION, SOLUTION INTRAMUSCULAR; INTRAVENOUS at 18:22

## 2023-11-15 RX ADMIN — DIPHENHYDRAMINE HYDROCHLORIDE 25 MG: 50 INJECTION INTRAMUSCULAR; INTRAVENOUS at 18:22

## 2023-11-15 ASSESSMENT — PAIN - FUNCTIONAL ASSESSMENT
PAIN_FUNCTIONAL_ASSESSMENT: 0-10
PAIN_FUNCTIONAL_ASSESSMENT: NONE - DENIES PAIN

## 2023-11-15 ASSESSMENT — PAIN SCALES - GENERAL: PAINLEVEL_OUTOF10: 5

## 2023-11-15 ASSESSMENT — PAIN DESCRIPTION - LOCATION: LOCATION: CHEST

## 2023-11-15 NOTE — ED TRIAGE NOTES
Presents to ED from home with complains of high blood pressure that has been ongoing since [employer]. Pt report he was recently started on losartan, but feels it has not been working. Pt also reports chest pain that began one hour ago. EKG completed.

## 2023-11-15 NOTE — ED PROVIDER NOTES
[facility] DEPT  EMERGENCY DEPARTMENT ENCOUNTER          Pt Name: Ubaldo Jordan  MRN: [id number]  [address] [facility] 1950  Date of evaluation: 11/15/2023  Physician: Christy Brock MD  Supervising Attending Physician: Yaima Coleman DO       CHIEF COMPLAINT       Chief Complaint   Patient presents with    Hypertension         HISTORY OF PRESENT ILLNESS    HPI  Ubaldo Jordan is a 68 y.o. male who presents to the emergency department from home, by private vehicle for evaluation of chest tightness and high blood pressure. Patient reports that last night began to fill it tightness sensation in his chest.  Patient reports that he took his blood pressure and is above 032 systolic. Patient reports he went to bed and this morning he is continue to have the chest tightness. Patient reports that he checked his blood pressure at home was in the 086 systolic. Patient states that he called the [place] and they told him to come in the ED. Patient describes it as a tightness across the front of his chest at a 6/10. Patient also reports some shortness of breath. Patient denies any fever or chills. Patient denies any nausea or vomiting. Patient reports that on Monday he was started on losartan for high blood pressure. Patient denies any other changes in medications. Patient denies any change in bowel habits. The patient has no other acute complaints at this time.           PAST MEDICAL AND SURGICAL HISTORY     Past Medical History:   Diagnosis Date    Gresham's esophagus     Hyperlipidemia     Hypertension      Past Surgical History:   Procedure Laterality Date    CHOLECYSTECTOMY  2005    COLONOSCOPY  2015    EGD  2017    HERNIA REPAIR  2005    KNEE ARTHROPLASTY Bilateral     NECK SURGERY           MEDICATIONS     Current Facility-Administered Medications:     droperidol (INAPSINE) injection 1.25 mg, 1.25 mg, IntraMUSCular, Q6H PRN, Christy Brock MD, 1.25 mg at 11/15/23 9609

## 2023-11-15 NOTE — ED NOTES
Patient resting in bed. Respirations easy and unlabored. No distress noted. Call light within reach.      Prema Ferris, KERON  11/15/23 5418

## 2023-11-15 NOTE — ED NOTES
Patient resting in bed with eyes closed, respirations easy and unlabored. Lights dimmed and door closed for patient comfort. Call light in reach. Will continue to monitor.       Billing, Garnetta Sicard [place]) M, RN  11/15/23 [id number]

## 2023-11-19 LAB
EKG ATRIAL RATE: 89 BPM
EKG P AXIS: 65 DEGREES
EKG P-R INTERVAL: 144 MS
EKG Q-T INTERVAL: 354 MS
EKG QRS DURATION: 88 MS
EKG QTC CALCULATION (BAZETT): 430 MS
EKG R AXIS: 22 DEGREES
EKG T AXIS: 42 DEGREES
EKG VENTRICULAR RATE: 89 BPM

## 2024-02-15 ENCOUNTER — HOSPITAL ENCOUNTER (EMERGENCY)
Age: 74
Discharge: HOME | End: 2024-02-15
Payer: OTHER GOVERNMENT

## 2024-02-15 VITALS
RESPIRATION RATE: 20 BRPM | SYSTOLIC BLOOD PRESSURE: 162 MMHG | OXYGEN SATURATION: 98 % | HEART RATE: 91 BPM | TEMPERATURE: 97.5 F | BODY MASS INDEX: 26.17 KG/M2 | WEIGHT: 182.4 LBS | DIASTOLIC BLOOD PRESSURE: 99 MMHG

## 2024-02-15 DIAGNOSIS — N30.01 ACUTE CYSTITIS WITH HEMATURIA: Primary | ICD-10-CM

## 2024-02-15 LAB
BILIRUB UR STRIP.AUTO-MCNC: NEGATIVE MG/DL
CHARACTER UR: CLEAR
COLOR: YELLOW
GLUCOSE UR QL STRIP.AUTO: NEGATIVE MG/DL
KETONES UR QL STRIP.AUTO: NEGATIVE
NITRITE UR QL STRIP.AUTO: NEGATIVE
PH UR STRIP.AUTO: 7 [PH] (ref 5–9)
PROT UR STRIP.AUTO-MCNC: 30 MG/DL
RBC #/AREA URNS HPF: ABNORMAL /[HPF]
SP GR UR STRIP.AUTO: 1.02 (ref 1–1.03)
UROBILINOGEN, URINE: 0.2 EU/DL (ref 0.2–1)
WBC #/AREA URNS HPF: ABNORMAL /[HPF]

## 2024-02-15 PROCEDURE — 99213 OFFICE O/P EST LOW 20 MIN: CPT

## 2024-02-15 PROCEDURE — 87086 URINE CULTURE/COLONY COUNT: CPT

## 2024-02-15 PROCEDURE — 99203 OFFICE O/P NEW LOW 30 MIN: CPT | Performed by: NURSE PRACTITIONER

## 2024-02-15 PROCEDURE — 81003 URINALYSIS AUTO W/O SCOPE: CPT

## 2024-02-15 RX ORDER — CIPROFLOXACIN 500 MG/1
500 TABLET, FILM COATED ORAL 2 TIMES DAILY
Qty: 14 TABLET | Refills: 0 | Status: SHIPPED | OUTPATIENT
Start: 2024-02-15 | End: 2024-02-22

## 2024-02-15 ASSESSMENT — PAIN - FUNCTIONAL ASSESSMENT: PAIN_FUNCTIONAL_ASSESSMENT: NONE - DENIES PAIN

## 2024-02-15 NOTE — ED PROVIDER NOTES
[facility] URGENT CARE  Urgent Care Encounter       CHIEF COMPLAINT       Chief Complaint   Patient presents with    Dysuria       Nurses Notes reviewed and I agree except as noted in the HPI.  HISTORY OF PRESENT ILLNESS   Mikel Vela is a 73 y.o. male who presents with complaints of discomfort and urinary frequency when urinating.  This is a new problem that started through the night.  Patient reports voiding 7-8 times this morning.  This is a new problem.  He does admit to problems with constipation.  He had a small bowel movement this morning.  Takes daily Dulcolax.  No reported flank pain or fever.  [facility]    The history is provided by the patient.       REVIEW OF SYSTEMS     Review of Systems   Constitutional:  Negative for fever.   Cardiovascular:  Negative for chest pain.   Gastrointestinal:  Negative for abdominal pain, nausea and vomiting.   Genitourinary:  Positive for dysuria and frequency. Negative for flank pain and hematuria.   Musculoskeletal:  Negative for myalgias.       PAST MEDICAL HISTORY         Diagnosis Date    Gresham's esophagus     Hyperlipidemia     Hypertension        SURGICALHISTORY     Patient  has a past surgical history that includes Knee Arthroplasty (Bilateral); Neck surgery; Cholecystectomy (2005); hernia repair (2005); Colonoscopy (2015); and EGD (2017).    CURRENT MEDICATIONS       Previous Medications    BISACODYL (DULCOLAX) 5 MG EC TABLET    See Prep Instructions    LOSARTAN (COZAAR) 25 MG TABLET    Take 1 tablet by mouth daily    OXYCODONE-ACETAMINOPHEN (PERCOCET)  MG PER TABLET    Take 1 tablet by mouth every 4 hours as needed for Pain.    POLYETHYLENE GLYCOL (GLYCOLAX) 17 GM/SCOOP POWDER    Dispense 238 Gram Bottle.  Use as Directed    TROLAMINE SALICYLATE (ASPERCREME) 10 % CREAM    Apply topically as needed for Pain Apply topically as needed.       ALLERGIES     Patient is is allergic to dilaudid  [hydromorphone] and tramadol.    Patients   There is no immunization history on file for this patient.    FAMILY HISTORY     Patient's family history is not on file.    SOCIAL HISTORY     Patient  reports that he has quit smoking. He has quit using smokeless tobacco.  His smokeless tobacco use included chew. He reports current alcohol use. He reports that he does not use drugs.    PHYSICAL EXAM     ED TRIAGE VITALS  BP: (!) 162/99, Temp: 97.5 °F (36.4 °C), Pulse: 91, Respirations: 20, SpO2: 98 %,Estimated body mass index is 26.17 kg/m² as calculated from the following:    Height as of 11/15/23: 1.778 m (5' 10\").    Weight as of this encounter: 82.7 kg (182 lb 6.4 oz).,No LMP for male patient.    Physical Exam  Vitals and nursing note reviewed.   Constitutional:       General: He is not in acute distress.  HENT:      Mouth/Throat:      Mouth: Mucous membranes are moist.   Cardiovascular:      Rate and Rhythm: Normal rate and regular rhythm.      Heart sounds: Normal heart sounds.   Pulmonary:      Effort: Pulmonary effort is normal.      Breath sounds: Normal breath sounds.   Abdominal:      General: Abdomen is flat. Bowel sounds are decreased. There is no distension.      Palpations: Abdomen is soft.      Tenderness: There is no abdominal tenderness. There is no right CVA tenderness, left CVA tenderness or guarding.   Skin:     General: Skin is warm and dry.   Neurological:      Mental Status: He is alert and oriented to person, place, and time.       DIAGNOSTIC RESULTS     Labs:  Results for orders placed or performed during the hospital encounter of 02/15/24   Urinalysis   Result Value Ref Range    Glucose, Ur Negative NEGATIVE mg/dl    Bilirubin Urine Negative NEGATIVE    Ketones, Urine Negative NEGATIVE    Specific Gravity, Urine 1.020 1.002 - 1.030    Blood, Urine Small (A) NEGATIVE    pH, Urine 7.00 5.0 - 9.0    Protein, Urine 30 (A) NEGATIVE mg/dl    Urobilinogen, Urine 0.20 0.2 - 1.0 eu/dl    Nitrite, Urine

## 2024-02-17 LAB — BACTERIA UR CULT: NORMAL

## 2024-06-02 ENCOUNTER — APPOINTMENT (OUTPATIENT)
Dept: CT IMAGING | Age: 74
End: 2024-06-02
Payer: OTHER MISCELLANEOUS

## 2024-06-02 ENCOUNTER — HOSPITAL ENCOUNTER (EMERGENCY)
Age: 74
Discharge: HOME | End: 2024-06-02
Attending: EMERGENCY MEDICINE
Payer: OTHER MISCELLANEOUS

## 2024-06-02 VITALS
SYSTOLIC BLOOD PRESSURE: 153 MMHG | BODY MASS INDEX: 26.05 KG/M2 | OXYGEN SATURATION: 98 % | DIASTOLIC BLOOD PRESSURE: 88 MMHG | HEART RATE: 90 BPM | WEIGHT: 182 LBS | TEMPERATURE: 98.1 F | HEIGHT: 70 IN | RESPIRATION RATE: 18 BRPM
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
## 2024-06-02 DIAGNOSIS — S16.1XXA STRAIN OF NECK MUSCLE, INITIAL ENCOUNTER: ICD-10-CM

## 2024-06-02 PROCEDURE — 99284 EMERGENCY DEPT VISIT MOD MDM: CPT

## 2024-06-02 PROCEDURE — 72125 CT NECK SPINE W/O DYE: CPT

## 2024-06-02 ASSESSMENT — PAIN SCALES - GENERAL
PAINLEVEL_OUTOF10: 7

## 2024-06-02 ASSESSMENT — PAIN - FUNCTIONAL ASSESSMENT
PAIN_FUNCTIONAL_ASSESSMENT: 0-10

## 2024-06-02 ASSESSMENT — PAIN DESCRIPTION - LOCATION: LOCATION: NECK

## 2024-06-02 NOTE — DISCHARGE INSTRUCTIONS
Return to the emergency department immediately for any change or worsening symptoms including but not limited to chest pain, shortness of breath, dizziness, nausea or vomiting.  Take Tylenol or ibuprofen as needed for pain.
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
## 2024-06-02 NOTE — ED PROVIDER NOTES
[facility]  EMERGENCY MEDICINE ATTENDING ATTESTATION      Evaluation of Mikel Vela.   Case discussed and care plan developed with resident physician.   I agree with the resident physician documentation and plan as documented by him, except if my documentation differs.   Patient seen, interviewed and examined by me.  I reviewed the medical, surgical, family and social history, medications and allergies.   I have reviewed and interpreted all available lab, radiology and ekg results available at the moment.  I have reviewed the nursing documentation.     Please see the resident physician completed note for final disposition except as documented on this attestation.   I have reviewed and interpreted all available lab, radiology and ekg results available at the moment.  Diagnosis, treatment and disposition plans were discussed and agreed upon by patient.   This transcription was electronically signed. It was dictated by use of voice recognition software and electronically transcribed. The transcription may contain errors not detected in proofreading.     I performed direct supervision and was present for the critical portion following procedures: None  Critical care time on this case: None    Electronically signed by Enoc Bourne MD on 6/2/24 at 1:25 PM EDT        Enoc Bourne MD  06/02/24 [id number]

## 2024-06-02 NOTE — ED NOTES
Pt resting in bed, respirations even and unlabored. No needs expressed at this time. Call light within reach. VSS

## 2024-08-14 ENCOUNTER — HOSPITAL ENCOUNTER (OUTPATIENT)
Dept: MRI IMAGING | Age: 74
Discharge: HOME | End: 2024-08-14
Attending: INTERNAL MEDICINE
Payer: OTHER GOVERNMENT

## 2024-08-14 DIAGNOSIS — M48.02 SPINAL STENOSIS, CERVICAL REGION: ICD-10-CM

## 2024-08-14 PROCEDURE — 72141 MRI NECK SPINE W/O DYE: CPT

## 2025-05-01 LAB
CREAT SERPL-MCNC: 0.7 MG/DL (ref 0.6–1.3)
CREATININE CLEARANCE: >60

## 2025-06-19 ENCOUNTER — OFFICE VISIT (OUTPATIENT)
Dept: NEUROLOGY | Age: 75
End: 2025-06-19
Payer: OTHER GOVERNMENT

## 2025-06-19 VITALS
BODY MASS INDEX: 24.97 KG/M2 | HEIGHT: 70 IN | DIASTOLIC BLOOD PRESSURE: 72 MMHG | SYSTOLIC BLOOD PRESSURE: 126 MMHG | WEIGHT: 174.38 LBS | HEART RATE: 64 BPM

## 2025-06-19 DIAGNOSIS — H91.91 HEARING LOSS OF RIGHT EAR, UNSPECIFIED HEARING LOSS TYPE: ICD-10-CM

## 2025-06-19 DIAGNOSIS — R42 DIZZINESS: Primary | ICD-10-CM

## 2025-06-19 PROCEDURE — 99205 OFFICE O/P NEW HI 60 MIN: CPT | Performed by: PSYCHIATRY & NEUROLOGY

## 2025-06-19 PROCEDURE — 1123F ACP DISCUSS/DSCN MKR DOCD: CPT | Performed by: PSYCHIATRY & NEUROLOGY

## 2025-06-19 NOTE — PATIENT INSTRUCTIONS
MRI brain WO contrast  Referral to Audiology re: right hearing loss  Start Lipoflavonoid over the counter daily  Call with any new symptoms or concerns.   Follow up in 2 months.

## 2025-06-28 NOTE — PROGRESS NOTES
Chief Complaint   Patient presents with    Consultation/Dizziness     Mikel Vela is a 74 y.o. male who presents today for evaluation of dizziness for the past 1 year. He describes the dizziness as lightheaded wooziness, no spinning sensation of the room. No recent falls. He has never been told he has low blood pressure. He can experience the dizziness in both sitting and standing positions. He can feel heart palpitation on occasion. He is not diabetic. CTA head and neck W/WO contrast done at [facility] 5/2/25 showed  No large vessel occlusion or intracranial aneurysm.   No carotid artery dissection or significant stenosis/atherosclerotic plaque.   He  denies chest pain. No shortness of breath, no neck pain. No vision changes. No dysphagia. No fever. No rash. No weight loss.  History provided by patient.      Past Medical History:   Diagnosis Date    Gresham's esophagus     Hyperlipidemia     Hypertension        There is no problem list on file for this patient.      Allergies   Allergen Reactions    Dilaudid [Hydromorphone] Shortness Of Breath    Tramadol Shortness Of Breath       Current Outpatient Medications   Medication Sig Dispense Refill    bisacodyl (DULCOLAX) 5 MG EC tablet See Prep Instructions 4 tablet 0    losartan (COZAAR) 25 MG tablet Take 1 tablet by mouth daily      oxyCODONE-acetaminophen (PERCOCET)  MG per tablet Take 1 tablet by mouth every 4 hours as needed for Pain.      polyethylene glycol (GLYCOLAX) 17 GM/SCOOP powder Dispense 238 Gram Bottle.  Use as Directed (Patient not taking: Reported on 6/19/2025) 238 g 0    trolamine salicylate (ASPERCREME) 10 % cream Apply topically as needed for Pain Apply topically as needed. (Patient not taking: Reported on 6/19/2025)       No current facility-administered medications for this visit.       Social History     Socioeconomic History    Marital status:      Spouse name: None    Number of children: None    Years of education: None    Highest

## 2025-07-21 ENCOUNTER — HOSPITAL ENCOUNTER (OUTPATIENT)
Dept: MRI IMAGING | Age: 75
Discharge: HOME | End: 2025-07-21
Attending: RADIOLOGY

## 2025-07-21 DIAGNOSIS — Z00.6 EXAMINATION FOR NORMAL COMPARISON OR CONTROL IN CLINICAL RESEARCH: ICD-10-CM
